# Patient Record
(demographics unavailable — no encounter records)

---

## 2024-10-30 NOTE — DATA REVIEWED
[FreeTextEntry1] : 3 x-ray views taken in the office today of her right wrist show no shift or change the alignment of the intra-articular distal radius fracture.

## 2024-10-30 NOTE — DISCUSSION/SUMMARY
[de-identified] : Today I gave the patient the option to stay in the short arm splint or convert to a short arm cast.  She prefers to stay in the splint.  I will see her back in 2 weeks for repeat x-ray and then conversion to a cock-up wrist brace.  All questions were answered today.  She will contact the office if she has any questions or concerns.

## 2024-10-30 NOTE — HISTORY OF PRESENT ILLNESS
[de-identified] : Patient is a 102-year-old female here for evaluation of her right wrist.  She is 2 weeks status post a intra-articular distal radius fracture of the radius.  She is accompanied by a family member.  She is feeling better.  She is in a volar and dorsal slab splint since the cast was bothering her when her wrist got swollen.

## 2024-10-30 NOTE — PHYSICAL EXAM
[de-identified] : Physical exam of her right wrist: She is in a volar and dorsal slab short arm splint.  She is comfortable in the splint.  She has a range of motion of the exposed it is.  She has brisk capillary refill.  No range of motion of the elbow.  Wrist median limited secondary to fracture and splint.

## 2024-11-13 NOTE — DATA REVIEWED
[FreeTextEntry1] : 3 x-ray views taken in the office today of her right wrist show no shift or change the alignment of the intra-articular distal radius fracture. There is some obscurity secondary to splint.

## 2024-11-13 NOTE — DISCUSSION/SUMMARY
[de-identified] : Today I removed the splint. The patient was placed in a cock-up wrist brace to get acclimated to being out of the cast. It can be removed to work on range of motion and bathing. She understands that fractures take about 6 weeks to heal.  Random residual pain can occur for 6 months to a year. She understands she will be left with this fracture deformity. She will continue to avoid any pushing, pulling, or heavy lifting. Follow up in about 3-4 weeks for repeat x-ray and evaluation. All questions were answered today.

## 2024-11-13 NOTE — HISTORY OF PRESENT ILLNESS
[de-identified] : Patient is a 102-year-old female here for repeat evaluation of her right wrist.  She is 4 weeks status post a intra-articular distal radius fracture of the radius.  She is accompanied by a family member.  She is feeling better.

## 2024-11-13 NOTE — PHYSICAL EXAM
[de-identified] : Physical exam of her right wrist: mild swelling in the fingers specifically her thumb secondary to the splint. Mild swelling in the wrist. Fracture deformity is present. She can make a full fist. Good ROM of the digits. Some stiffness with ROM of the wrist secondary to the splint.

## 2024-12-05 NOTE — DATA REVIEWED
[FreeTextEntry1] : 3 x-ray views taken in the office today of her right wrist show a healing intra-articular distal radius fracture.

## 2024-12-05 NOTE — PHYSICAL EXAM
[de-identified] : Physical exam of her right wrist: Swelling has resolved.  Negative ecchymosis.  Mild fracture deformity.  Nontender over the distal radius or distal ulna.  She has good range of motion of the wrist and fingers.  She can make a full fist.  Her sensory and motor are intact.

## 2024-12-05 NOTE — DISCUSSION/SUMMARY
[de-identified] : The patient is now 2 months from the injury. She may discontinue the cock-up wrist brace. Random residual pain can occur for 6 months to a year. She may return to normal activities as tolerated. We will see her back on an as-needed basis. All questions were answered today.

## 2024-12-05 NOTE — HISTORY OF PRESENT ILLNESS
[de-identified] : Patient is a 102-year-old female here for repeat evaluation of her right wrist.  She is 2 month status post an intra-articular distal radius fracture of the radius.  She is accompanied by a family member.  She is feeling better.